# Patient Record
Sex: MALE | URBAN - METROPOLITAN AREA
[De-identification: names, ages, dates, MRNs, and addresses within clinical notes are randomized per-mention and may not be internally consistent; named-entity substitution may affect disease eponyms.]

---

## 2018-01-04 ENCOUNTER — HOSPITAL ENCOUNTER (EMERGENCY)
Facility: CLINIC | Age: 63
Discharge: HOME | End: 2018-01-04
Attending: EMERGENCY MEDICINE | Admitting: EMERGENCY MEDICINE
Payer: COMMERCIAL

## 2018-01-04 ENCOUNTER — APPOINTMENT (OUTPATIENT)
Dept: GENERAL RADIOLOGY | Facility: CLINIC | Age: 63
End: 2018-01-04
Attending: EMERGENCY MEDICINE
Payer: COMMERCIAL

## 2018-01-04 VITALS
SYSTOLIC BLOOD PRESSURE: 104 MMHG | DIASTOLIC BLOOD PRESSURE: 64 MMHG | TEMPERATURE: 100.1 F | RESPIRATION RATE: 18 BRPM | HEART RATE: 115 BPM | OXYGEN SATURATION: 98 %

## 2018-01-04 DIAGNOSIS — J18.9 PNEUMONIA OF RIGHT LOWER LOBE DUE TO INFECTIOUS ORGANISM: ICD-10-CM

## 2018-01-04 DIAGNOSIS — D72.825 BANDEMIA: ICD-10-CM

## 2018-01-04 DIAGNOSIS — R00.0 SINUS TACHYCARDIA: ICD-10-CM

## 2018-01-04 DIAGNOSIS — D64.89 ANEMIA DUE TO OTHER CAUSE, NOT CLASSIFIED: ICD-10-CM

## 2018-01-04 LAB
ABO + RH BLD: NORMAL
ABO + RH BLD: NORMAL
ANION GAP SERPL CALCULATED.3IONS-SCNC: 9 MMOL/L (ref 3–14)
ANISOCYTOSIS BLD QL SMEAR: ABNORMAL
BASOPHILS # BLD AUTO: 0 10E9/L (ref 0–0.2)
BASOPHILS NFR BLD AUTO: 0 %
BLD GP AB SCN SERPL QL: NORMAL
BLOOD BANK CMNT PATIENT-IMP: NORMAL
BUN SERPL-MCNC: 8 MG/DL (ref 7–30)
CALCIUM SERPL-MCNC: 8.1 MG/DL (ref 8.5–10.1)
CHLORIDE SERPL-SCNC: 102 MMOL/L (ref 94–109)
CO2 SERPL-SCNC: 24 MMOL/L (ref 20–32)
COPATH REPORT: NORMAL
CREAT SERPL-MCNC: 0.74 MG/DL (ref 0.66–1.25)
DACRYOCYTES BLD QL SMEAR: SLIGHT
DIFFERENTIAL METHOD BLD: ABNORMAL
EOSINOPHIL # BLD AUTO: 0 10E9/L (ref 0–0.7)
EOSINOPHIL NFR BLD AUTO: 0 %
ERYTHROCYTE [DISTWIDTH] IN BLOOD BY AUTOMATED COUNT: 27.3 % (ref 10–15)
FERRITIN SERPL-MCNC: 2456 NG/ML (ref 26–388)
GFR SERPL CREATININE-BSD FRML MDRD: >90 ML/MIN/1.7M2
GLUCOSE SERPL-MCNC: 126 MG/DL (ref 70–99)
HCT VFR BLD AUTO: 15.8 % (ref 40–53)
HGB BLD-MCNC: 5.4 G/DL (ref 13.3–17.7)
INTERPRETATION ECG - MUSE: NORMAL
IRON SATN MFR SERPL: 14 % (ref 15–46)
IRON SERPL-MCNC: 17 UG/DL (ref 35–180)
LACTATE BLD-SCNC: 1.7 MMOL/L (ref 0.7–2)
LYMPHOCYTES # BLD AUTO: 1.6 10E9/L (ref 0.8–5.3)
LYMPHOCYTES NFR BLD AUTO: 11 %
MCH RBC QN AUTO: 18.4 PG (ref 26.5–33)
MCHC RBC AUTO-ENTMCNC: 34.2 G/DL (ref 31.5–36.5)
MCV RBC AUTO: 54 FL (ref 78–100)
METAMYELOCYTES # BLD: 0.1 10E9/L
METAMYELOCYTES NFR BLD MANUAL: 1 %
MONOCYTES # BLD AUTO: 0.6 10E9/L (ref 0–1.3)
MONOCYTES NFR BLD AUTO: 4 %
NEUTROPHILS # BLD AUTO: 12.4 10E9/L (ref 1.6–8.3)
NEUTROPHILS NFR BLD AUTO: 84 %
NRBC # BLD AUTO: 0.4 10*3/UL
NRBC BLD AUTO-RTO: 3 /100
NUM BPU REQUESTED: 1
OVALOCYTES BLD QL SMEAR: SLIGHT
PLATELET # BLD AUTO: 475 10E9/L (ref 150–450)
PLATELET # BLD EST: ABNORMAL 10*3/UL
POIKILOCYTOSIS BLD QL SMEAR: ABNORMAL
POTASSIUM SERPL-SCNC: 3.3 MMOL/L (ref 3.4–5.3)
RBC # BLD AUTO: 2.93 10E12/L (ref 4.4–5.9)
RBC INCLUSIONS BLD: SLIGHT
RETICS # AUTO: 46.9 10E9/L (ref 25–95)
RETICS/RBC NFR AUTO: 1.6 % (ref 0.5–2)
SMUDGE CELLS BLD QL SMEAR: PRESENT
SODIUM SERPL-SCNC: 135 MMOL/L (ref 133–144)
SPECIMEN EXP DATE BLD: NORMAL
SPHEROCYTES BLD QL SMEAR: SLIGHT
TARGETS BLD QL SMEAR: ABNORMAL
TIBC SERPL-MCNC: 119 UG/DL (ref 240–430)
WBC # BLD AUTO: 14.8 10E9/L (ref 4–11)

## 2018-01-04 PROCEDURE — 83605 ASSAY OF LACTIC ACID: CPT | Performed by: EMERGENCY MEDICINE

## 2018-01-04 PROCEDURE — 93005 ELECTROCARDIOGRAM TRACING: CPT

## 2018-01-04 PROCEDURE — 40000847 ZZHCL STATISTIC MORPHOLOGY W/INTERP HISTOLOGY TC 85060: Performed by: EMERGENCY MEDICINE

## 2018-01-04 PROCEDURE — 25000132 ZZH RX MED GY IP 250 OP 250 PS 637: Performed by: EMERGENCY MEDICINE

## 2018-01-04 PROCEDURE — 86850 RBC ANTIBODY SCREEN: CPT | Performed by: EMERGENCY MEDICINE

## 2018-01-04 PROCEDURE — 96366 THER/PROPH/DIAG IV INF ADDON: CPT

## 2018-01-04 PROCEDURE — 80048 BASIC METABOLIC PNL TOTAL CA: CPT | Performed by: EMERGENCY MEDICINE

## 2018-01-04 PROCEDURE — 71046 X-RAY EXAM CHEST 2 VIEWS: CPT

## 2018-01-04 PROCEDURE — 25000128 H RX IP 250 OP 636: Performed by: EMERGENCY MEDICINE

## 2018-01-04 PROCEDURE — 86901 BLOOD TYPING SEROLOGIC RH(D): CPT | Performed by: EMERGENCY MEDICINE

## 2018-01-04 PROCEDURE — 86923 COMPATIBILITY TEST ELECTRIC: CPT | Performed by: EMERGENCY MEDICINE

## 2018-01-04 PROCEDURE — 99285 EMERGENCY DEPT VISIT HI MDM: CPT | Mod: 25

## 2018-01-04 PROCEDURE — 85060 BLOOD SMEAR INTERPRETATION: CPT | Performed by: EMERGENCY MEDICINE

## 2018-01-04 PROCEDURE — 83550 IRON BINDING TEST: CPT | Performed by: EMERGENCY MEDICINE

## 2018-01-04 PROCEDURE — 25000125 ZZHC RX 250: Performed by: EMERGENCY MEDICINE

## 2018-01-04 PROCEDURE — 83540 ASSAY OF IRON: CPT | Performed by: EMERGENCY MEDICINE

## 2018-01-04 PROCEDURE — 86900 BLOOD TYPING SEROLOGIC ABO: CPT | Performed by: EMERGENCY MEDICINE

## 2018-01-04 PROCEDURE — 85025 COMPLETE CBC W/AUTO DIFF WBC: CPT | Performed by: EMERGENCY MEDICINE

## 2018-01-04 PROCEDURE — 82728 ASSAY OF FERRITIN: CPT | Performed by: EMERGENCY MEDICINE

## 2018-01-04 PROCEDURE — 87040 BLOOD CULTURE FOR BACTERIA: CPT | Performed by: EMERGENCY MEDICINE

## 2018-01-04 PROCEDURE — 96368 THER/DIAG CONCURRENT INF: CPT

## 2018-01-04 PROCEDURE — 96365 THER/PROPH/DIAG IV INF INIT: CPT

## 2018-01-04 PROCEDURE — 94640 AIRWAY INHALATION TREATMENT: CPT

## 2018-01-04 PROCEDURE — 85045 AUTOMATED RETICULOCYTE COUNT: CPT | Performed by: EMERGENCY MEDICINE

## 2018-01-04 RX ORDER — IPRATROPIUM BROMIDE AND ALBUTEROL SULFATE 2.5; .5 MG/3ML; MG/3ML
SOLUTION RESPIRATORY (INHALATION)
Status: DISCONTINUED
Start: 2018-01-04 | End: 2018-01-04 | Stop reason: HOSPADM

## 2018-01-04 RX ORDER — GUAIFENESIN 600 MG/1
TABLET, EXTENDED RELEASE ORAL 2 TIMES DAILY PRN
COMMUNITY

## 2018-01-04 RX ORDER — MULTIVITAMIN,THERAPEUTIC
1 TABLET ORAL DAILY
COMMUNITY

## 2018-01-04 RX ORDER — POTASSIUM CHLORIDE 1500 MG/1
20 TABLET, EXTENDED RELEASE ORAL ONCE
Status: COMPLETED | OUTPATIENT
Start: 2018-01-04 | End: 2018-01-04

## 2018-01-04 RX ORDER — AZITHROMYCIN 250 MG/1
TABLET, FILM COATED ORAL
Qty: 4 TABLET | Refills: 0 | Status: SHIPPED | OUTPATIENT
Start: 2018-01-04

## 2018-01-04 RX ORDER — MENTHOL
LOZENGE MUCOUS MEMBRANE DAILY PRN
COMMUNITY

## 2018-01-04 RX ORDER — CEFTRIAXONE SODIUM 1 G/50ML
1 INJECTION, SOLUTION INTRAVENOUS ONCE
Status: COMPLETED | OUTPATIENT
Start: 2018-01-04 | End: 2018-01-04

## 2018-01-04 RX ORDER — IPRATROPIUM BROMIDE AND ALBUTEROL SULFATE 2.5; .5 MG/3ML; MG/3ML
3 SOLUTION RESPIRATORY (INHALATION) ONCE
Status: COMPLETED | OUTPATIENT
Start: 2018-01-04 | End: 2018-01-04

## 2018-01-04 RX ADMIN — AZITHROMYCIN MONOHYDRATE 500 MG: 500 INJECTION, POWDER, LYOPHILIZED, FOR SOLUTION INTRAVENOUS at 11:07

## 2018-01-04 RX ADMIN — CEFTRIAXONE SODIUM 1 G: 1 INJECTION, SOLUTION INTRAVENOUS at 10:38

## 2018-01-04 RX ADMIN — IPRATROPIUM BROMIDE AND ALBUTEROL SULFATE 3 ML: .5; 3 SOLUTION RESPIRATORY (INHALATION) at 09:30

## 2018-01-04 RX ADMIN — POTASSIUM CHLORIDE 20 MEQ: 1500 TABLET, EXTENDED RELEASE ORAL at 10:38

## 2018-01-04 NOTE — ED NOTES
[facility]  ED Nurse Handoff Report    Saul Trujillo is a 62 year old male   ED Chief complaint: Cough  . ED Diagnosis:   Final diagnoses:   None     Allergies: No Known Allergies    Code Status: Full Code  Activity level - Baseline/Home:  Independent. Activity Level - Current:   Stand with Assist. Lift room needed: No. Bariatric: No   Needed: Yes  Isolation: No. Infection: Not Applicable.     Vital Signs:   Vitals:    01/04/18 1000 01/04/18 1015 01/04/18 1030 01/04/18 1045   BP: 121/63 116/64  115/62   Pulse:       Resp:       Temp:       TempSrc:       SpO2: 98% 97% 95% 95%       Cardiac Rhythm:  ,      Pain level:    Patient confused: No. Patient Falls Risk: No.   Elimination Status: Has voided   Patient Report - Initial Complaint: cough.   Focused Assessment: Respiratory - Respiratory WDL:  WDL except; all; cough Rhythm/Pattern, Respiratory: pattern irregular; shortness of breath reported Lung Fields: All Fields Throughout All Lung Fields: coarse; crackles coarse   Abnormal Results:   XR Chest 2 Views   Preliminary Result   IMPRESSION:  Right lower lobe infiltrate consistent with pneumonia.         Labs Ordered and Resulted from Time of ED Arrival Up to the Time of Departure from the ED   CBC WITH PLATELETS DIFFERENTIAL - Abnormal; Notable for the following:        Result Value    WBC 14.8 (*)     RBC Count 2.93 (*)     Hemoglobin 5.4 (*)     Hematocrit 15.8 (*)     MCV 54 (*)     MCH 18.4 (*)     RDW 27.3 (*)     Platelet Count 475 (*)     Nucleated RBCs 3 (*)     Absolute Neutrophil 12.4 (*)     Absolute Metamyelocytes 0.1 (*)     All other components within normal limits   BASIC METABOLIC PANEL - Abnormal; Notable for the following:     Potassium 3.3 (*)     Glucose 126 (*)     Calcium 8.1 (*)     All other components within normal limits   IRON AND IRON BINDING CAPACITY - Abnormal; Notable for the following:     Iron 17 (*)     All other components within normal limits   LACTIC ACID  WHOLE BLOOD   FERRITIN   BLOOD MORPHOLOGY PATHOLOGIST REVIEW   RETICULOCYTE COUNT   ABO/RH TYPE AND SCREEN   RED BLOOD CELL PREPARE ORDER UNIT   .   Treatments provided: neb, fluids, blood, abx  Family Comments: family at bedside  OBS brochure/video discussed/provided to patient:  No  ED Medications:   Medications   ipratropium - albuterol 0.5 mg/2.5 mg/3 mL (DUONEB) 0.5-2.5 (3) MG/3ML neb solution (not administered)   azithromycin (ZITHROMAX) 500 mg in NaCl 0.9 % 250 mL intermittent infusion (500 mg Intravenous New Bag 1/4/18 1107)   ipratropium - albuterol 0.5 mg/2.5 mg/3 mL (DUONEB) neb solution 3 mL (3 mLs Nebulization Given 1/4/18 0930)   potassium chloride SA (K-DUR/KLOR-CON M) CR tablet 20 mEq (20 mEq Oral Given 1/4/18 1038)   cefTRIAXone in d5w (ROCEPHIN) intermittent infusion 1 g (1 g Intravenous New Bag 1/4/18 1038)     Drips infusing:  Yes- abx  For the majority of the shift, the patient's behavior Green. Interventions performed were therapeutic communication, pt education.     Severe Sepsis OR Septic Shock Diagnosis Present: No      ED Nurse Name/Phone Number: Andrew Ray,   11:24 AM

## 2018-01-04 NOTE — ED NOTES
Pt reports having a cough for the last two weeks. Pt reports having a fever and shortness of breath. Pt denies n/v. Pt took mucinex, vicks, and tylenol with minimal relief. Pt A&Ox4. ABCDs intact.

## 2018-01-04 NOTE — ED AVS SNAPSHOT
[facility] Emergency Department    [address]    [facility] [address]    Phone:  [phone]    Fax:  [phone]                                       Saul Trujillo   MRN: [id number]    Department:  [facility] Emergency Department   Date of Visit:  1/4/2018           After Visit Summary Signature Page     I have received my discharge instructions, and my questions have been answered. I have discussed any challenges I see with this plan with the nurse or doctor.    ..........................................................................................................................................  Patient/Patient Representative Signature      ..........................................................................................................................................  Patient Representative Print Name and Relationship to Patient    ..................................................               ................................................  Date                                            Time    ..........................................................................................................................................  Reviewed by Signature/Title    ...................................................              ..............................................  Date                                                            Time

## 2018-01-04 NOTE — ED AVS SNAPSHOT
[facility] Emergency Department    [address]    [facility] [address]    Phone:  [phone]    Fax:  [phone]                                       Saul Trujillo   MRN: [id number]    Department:  [facility] Emergency Department   Date of Visit:  1/4/2018           Patient Information     Date Of Birth          1955        Your diagnoses for this visit were:     Pneumonia of right lower lobe due to infectious organism (H)     Anemia due to other cause, not classified     Bandemia     Sinus tachycardia        You were seen by Marycruz Palacios MD.      Follow-up Information     Follow up with Isha Nagy. Schedule an appointment as soon as possible for a visit in 1 day.    Contact information:    [address]  [place] [id number] [phone]          Follow up with Simba Altman MD. Schedule an appointment as soon as possible for a visit in 1 day.    Specialty:  Oncology    Contact information:    [place] [facility]  [address] [address]  [facility] [id number] [phone]          Discharge Instructions       Please see a PCP tomorrow for blood testing and a recheck.      Please return to the ED if you have worsening cough, fevers >101, chest pain, shortness of breath, intractable vomiting, or other acute changes.  Please follow up with your PCP TOMORROW.      Drink plenty of fluids and rest.      Your hemoglobin is dangerously low you need this rechecked tomorrow.  Any lightheadedness, dizziness, chest pain return to the ED.      Discharge Instructions  Bronchitis, Pneumonia, Bronchospasm    You were seen today for a chest infection or inflammation. If your provider decided this was due to a bacterial infection, you may need an antibiotic. Sometimes these are caused by a virus, and then an antibiotic will not help.     Generally, every Emergency Department visit should have a follow-up clinic visit with either a primary or a  specialty clinic/provider. Please follow-up as instructed by your emergency provider today.    Return to the Emergency Department if:    Your breathing gets much worse.    You are very weak, or feel much more ill.    You develop new symptoms, such as chest pain.    You cough up blood.    You are vomiting (throwing up) enough that you cannot keep fluids or your medicine down.    What can I do to help myself?    Fill any prescriptions the provider gave you and take them right away--especially antibiotics. Be sure to finish the whole antibiotic prescription.    You may be given a prescription for an inhaler, which can help loosen tight air passages.  Use this as needed, but not more often than directed. Inhalers work much better when used with a spacer.     You may be given a prescription for a steroid to reduce inflammation. Used long-term, these can have side effects, but for short-term use they are safe. You may notice restlessness or increased appetite.        You may use non-prescription cough or cold medicines. Cough medicines may help, but don t make the cough go away completely.     Avoid smoke, because this can make your symptoms worse. If you smoke, this may be a good time to quit! Consider using nicotine lozenges, gum, or patches to reduce cravings.     If you have a fever, Tylenol  (acetaminophen), Motrin  (ibuprofen), or Advil  (ibuprofen) may help bring fever down and may help you feel more comfortable. Be sure to read and follow the package directions, and ask your provider if you have questions.    Be sure to get your flu shot each year.  For certain ages, the pneumonia shot can help prevent pneumonia.  If you were given a prescription for medicine here today, be sure to read all of the information (including the package insert) that comes with your prescription.  This will include important information about the medicine, its side effects, and any warnings that you need to know about.  The pharmacist  who fills the prescription can provide more information and answer questions you may have about the medicine.  If you have questions or concerns that the pharmacist cannot address, please call or return to the Emergency Department.     Remember that you can always come back to the Emergency Department if you are not able to see your regular provider in the amount of time listed above, if you get any new symptoms, or if there is anything that worries you.        Anemia  Anemia is a condition that occurs when your body does not have enough healthy red blood cells (RBCs). RBCs are the parts of your blood that carry oxygen throughout your body. A protein called hemoglobin allows your RBCs to absorb and release oxygen. Without enough RBCs or hemoglobin, your body doesn't get enough oxygen. Symptoms of anemia may then occur.    What are the symptoms of anemia?  Some people with anemia have no symptoms. But most people have symptoms that range from mild to severe. These can include:    Tiredness (fatigue)    Weakness    Pale skin    Shortness of breath    Dizziness or fainting    Rapid heartbeat    Trouble doing normal amounts of activity    Jaundice (yellowing of your eyes, skin, or mouth; dark urine)  What causes anemia?  Anemia can occur when your body:    Loses too much blood    Does not make enough RBCs    Destroys your RBCs at a faster rate than it can replace them    Does not make a normal amount of hemoglobin in your RBCs  These problems can occur for many reasons, including:    A condition that you are born with (congenital or inherited), such as sickle cell disease or thalassemia    Heavy bleeding for any reason, including injury, surgery, childbirth, or even heavy menstrual periods    Being low in certain nutrients, such as iron, folate, or vitamin B12, possibly from a poor diet or a condition like celiac disease or Crohn's disease    Certain chronic conditions like diabetes, arthritis, or kidney  disease    Certain chronic infections like tuberculosis or HIV    Exposure to certain medicines, such as those used for chemotherapy  There are different types of anemia. Your healthcare provider can tell you more about the type of anemia you have and what may have caused it.  How is anemia diagnosed?  To diagnose anemia, your healthcare provider orders blood tests. These can include:    Complete blood cell count (CBC). This test measures the amounts of the different types of blood cells.    Blood smear. This test checks the size and shape of your blood cells. To do the test, a drop of your blood is viewed under a microscope. A stain is used to make the blood cells easier to see.    Iron studies. These tests measure the amount of iron in your blood. Your body needs iron to make hemoglobin in your RBCs.    Vitamin B12 and folate studies. These tests check for some of the components that help give RBCs a normal size and shape.    Reticulocyte count. This test measures the amount of new RBCs that your bone marrow makes.    Hemoglobin electrophoresis. This test checks for problems with your hemoglobin in RBCs.  How is anemia treated?  Treatment for anemia is based on the type of anemia, its cause, and the severity of your symptoms. Treatments may include:    Diet changes. This involves increasing the amount of certain nutrients in your diet, such as iron, vitamin B12, or folate. Your healthcare provider may also prescribe nutrient supplements.    Medicines. Certain medicines treat the cause of your anemia. Others help build new RBCs or relieve symptoms. If a medicine is the cause of your anemia, you may need to stop or change it.    Blood transfusions. Replacing some of your blood can increase the number of healthy RBCs in your body.    Surgery. In some cases, your doctor may do surgery to treat the underlying cause of anemia. If you need surgery, your healthcare provider will explain the procedure and outline the risks  and benefits for you.  What are the long-term concerns?  If you have a certain type of anemia, you can expect a full recovery after treatment. If you have other types of anemia (especially a type you're born with), you will need to manage it for life. Your doctor can tell you more.  Date Last Reviewed: 12/1/2016 2000-2017 [employer]. [address] [place] [address], [place] [address]. All rights reserved. This information is not intended as a substitute for professional medical care. Always follow your healthcare professional's instructions.            24 Hour Appointment Hotline       To make an appointment at any [facility], call [phone] ([phone]). If you don't have a family doctor or clinic, we will help you find one. [facility] clinics are conveniently located to serve the needs of you and your family.             Review of your medicines      START taking        Dose / Directions Last dose taken    amoxicillin-clavulanate 875-125 MG per tablet   Commonly known as:  AUGMENTIN   Dose:  1 tablet   Quantity:  20 tablet        Take 1 tablet by mouth 2 times daily for 10 days   Refills:  0        azithromycin 250 MG tablet   Commonly known as:  ZITHROMAX Z-SAUL   Quantity:  4 tablet        1 tablet daily for 4 days.   Refills:  0          Our records show that you are taking the medicines listed below. If these are incorrect, please call your family doctor or clinic.        Dose / Directions Last dose taken    camphor-eucalyptus-menthol 4.73-1.2-2.6 % Oint ointment        Apply topically daily as needed for cough   Refills:  0        guaiFENesin 600 MG 12 hr tablet   Commonly known as:  MUCINEX        Take by mouth 2 times daily as needed   Refills:  0        multivitamin, therapeutic Tabs tablet   Dose:  1 tablet        Take 1 tablet by mouth daily   Refills:  0                Prescriptions were sent or printed at these locations (2 Prescriptions)                   Other Prescriptions                 Printed at Department/Unit printer (2 of 2)         azithromycin (ZITHROMAX Z-SAUL) 250 MG tablet               amoxicillin-clavulanate (AUGMENTIN) 875-125 MG per tablet                Procedures and tests performed during your visit     ABO/Rh type and screen    Basic metabolic panel    Blood Morphology Pathologist Review    Blood component    Blood culture    CBC with platelets differential    EKG 12-lead, tracing only    Ferritin    Iron and iron binding capacity    Lactic acid whole blood    Red blood cell prepare order unit    Reticulocyte Count    XR Chest 2 Views      Orders Needing Specimen Collection     None      Pending Results     Date and Time Order Name Status Description    1/4/2018 1137 Blood culture In process     1/4/2018 0928 XR Chest 2 Views Preliminary             Pending Culture Results     Date and Time Order Name Status Description    1/4/2018 1137 Blood culture In process             Pending Results Instructions     If you had any lab results that were not finalized at the time of your Discharge, you can call the ED Lab Result RN at [phone]. You will be contacted by this team for any positive Lab results or changes in treatment. The nurses are available 7 days a week from 10A to 6:30P.  You can leave a message 24 hours per day and they will return your call.        Test Results From Your Hospital Stay        1/4/2018 11:32 AM      Component Results     Component Value Ref Range & Units Status    WBC 14.8 (H) 4.0 - 11.0 10e9/L Final    RBC Count 2.93 (L) 4.4 - 5.9 10e12/L Final    Hemoglobin 5.4 (LL) 13.3 - 17.7 g/dL Final    This result has been called to ISAÍAS SOLIS by Christine Quinones on 01 04 2018   at 1014, and has been read back.       Hematocrit 15.8 (L) 40.0 - 53.0 % Final    MCV 54 (L) 78 - 100 fl Final    MCH 18.4 (L) 26.5 - 33.0 pg Final    MCHC 34.2 31.5 - 36.5 g/dL Final    RDW 27.3 (H) 10.0 - 15.0 % Final    Platelet Count 475 (H) 150 - 450 10e9/L Final     Platelets clumped    Diff Method Manual Differential  Final     Performed    % Neutrophils 84.0 % Final    % Lymphocytes 11.0 % Final    % Monocytes 4.0 % Final    % Eosinophils 0.0 % Final    % Basophils 0.0 % Final    % Metamyelocytes 1.0 % Final    Nucleated RBCs 3 (H) 0 /100 Final    Absolute Neutrophil 12.4 (H) 1.6 - 8.3 10e9/L Final    Absolute Lymphocytes 1.6 0.8 - 5.3 10e9/L Final    Absolute Monocytes 0.6 0.0 - 1.3 10e9/L Final    Absolute Eosinophils 0.0 0.0 - 0.7 10e9/L Final    Absolute Basophils 0.0 0.0 - 0.2 10e9/L Final    Absolute Metamyelocytes 0.1 (H) 0 10e9/L Final    Absolute Nucleated RBC 0.4  Final    Anisocytosis Marked  Final    Poikilocytosis Marked  Final    Spherocytes Slight  Final    RBC Fragments Slight  Final    Teardrop Cells Slight  Final    Ovalocytes Slight  Final    Target Cells Marked  Final    Smudge Cells Present  Final    Platelet Estimate   Final    Automated count confirmed.  Giant platelets are present.         1/4/2018 10:12 AM      Component Results     Component Value Ref Range & Units Status    Sodium 135 133 - 144 mmol/L Final    Potassium 3.3 (L) 3.4 - 5.3 mmol/L Final    Chloride 102 94 - 109 mmol/L Final    Carbon Dioxide 24 20 - 32 mmol/L Final    Anion Gap 9 3 - 14 mmol/L Final    Glucose 126 (H) 70 - 99 mg/dL Final    Urea Nitrogen 8 7 - 30 mg/dL Final    Creatinine 0.74 0.66 - 1.25 mg/dL Final    GFR Estimate >90 >60 mL/min/1.7m2 Final    Non  GFR Calc    GFR Estimate If Black >90 >60 mL/min/1.7m2 Final    African American GFR Calc    Calcium 8.1 (L) 8.5 - 10.1 mg/dL Final         1/4/2018  9:54 AM      Component Results     Component Value Ref Range & Units Status    Lactic Acid 1.7 0.7 - 2.0 mmol/L Final         1/4/2018 10:12 AM      Narrative     CHEST TWO VIEWS  1/4/2018 9:54 AM    HISTORY:  Cough, wheezing.     COMPARISON:  None.        Impression     IMPRESSION:  Right lower lobe infiltrate consistent with pneumonia.           1/4/2018  1:23 PM      Component Results     Component Value Ref Range & Units Status    Units Ordered 1  Final    ABO AB  Final    RH(D) Pos  Final    Antibody Screen Neg  Final    Test Valid Only At [facility]     Final    Specimen Expires 01/07/2018  Final         1/4/2018 11:36 AM      Component Results     Component Value Ref Range & Units Status    Iron 17 (L) 35 - 180 ug/dL Final    Iron Binding Cap 119 (L) 240 - 430 ug/dL Final    Iron Saturation Index 14 (L) 15 - 46 % Final         1/4/2018 11:39 AM      Component Results     Component Value Ref Range & Units Status    Ferritin 2456 (H) 26 - 388 ng/mL Final         1/4/2018 12:36 PM      Component Results     Component    Copath Report    Patient Name: DORIS CANDELARIA  MR#: [id number]  Specimen #:   Collected: 1/4/2018  Received: 1/4/2018  Reported: 1/4/2018 12:34  Ordering Phy(s): ALEKSANDER VILLARREAL    For improved result formatting, select 'View Enhanced Report Format' under   Linked Documents section.    TEST(S):  Peripheral Smear Morphology    FINAL DIAGNOSIS:  Peripheral blood.  - Leukocytosis with absolute neutrophilia with slight left shift   compatible with reactive neutrophilia.  Hypochromic microcytic anemia.  Thrombocytosis.    COMMENT:  The anemia combined with the morphologic features suggest the possibility   of an underlying hemoglobinopathy,  thalassemia, or combined hemoglobinopathy thalassemia trait.  Underlying   or superimposed iron deficiency due  to blood loss, nutritional deficiency, malabsorption, hemolysis from   hemoglobinopathy or thalassemia etc.  should also be considered.  An anemia of mixed etiology cannot be   excluded.  The leukocytosis with absolute  neutrophilia is compatible with reactive neutrophilia.  The mild   thrombocytosis is nonspecific and can be  associated with multiple conditions including physiologic, stress,   inflammatory conditions, infection,  asplenic or hyposplenic state, iron  deficiency, etc.  Clinical correlation   is required.    Electronically signed out by:    Jonathan Rogers M.D.    CLINICAL HISTORY:   Pneumonia.  Anemia.    PERIPHERAL BLOOD DATA:    PERIPHERAL BLOOD DATA  Patient Value (Reference Range >18 year old male)  14.8 .......WBC (4.0-11.0 x 10*9/L)  2.93 .......RBC (4.4-5.9 x 10*12/L)  5.4 .......HGB (13.3-17.7 g/dL)  15.8 .......HCT (40.0-53.0 %)  54 .......MCV (78-100fL)  18.4 .......MCH (26.5-33.0 pg)  34.2 .......MCHC (31.5-36.5 g/dL)  27.3 .......RDW (10.0-15.0 %)  475 .......PLT (150-450 x 10*9/L)  1.6 .......RETIC (0.5-2.0%)    PERIPHERAL BLOOD DIFFERENTIAL  (Reference ranges >18 year old)    Percent  83....Neutrophils, segmented and bands (40 - 75)  11....Lymphocytes (20 - 48)  4....Monocytes (0 - 12)  1....Eosinophils (0 - 6)  0....Basophils (0 - 2)  1....  Metamyelocytes    Absolute  12.3....Neutrophils, segmented and bands  (1.6 - 8.3 x 10*9/L)  1.6....Lymphocytes  (0.8 - 5.3 x 10*9/L)  0.6....Monocytes  (0 -1.3 x 10*9/L)  0.1....Eosinophils  (0 - 0.7 x 10*9/L)  0....Basophils  (0 - 0.2 x 10*9/L)    PERIPHERAL MORPHOLOGY:    ERYTHROCYTES: Appear reduced and show prominent anisopoikilocytosis.    There are numerous target forms.  There  are scattered elliptocytes, teardrop forms, and small numbers of   fragments.  There are a small number of  nucleated red blood cells (3 per 100 leukocytes).    LEUKOCYTES: Granulocytes appear predominantly mature and segmented.  There   is a mild left shift.  Some of the  granulocytes show prominent cytoplasmic granules.  Lymphocytes are   predominantly small with occasional  reactive appearing form.  Monocytes appear mature.  No obvious dysplastic   changes are seen.  No blasts are  identified.    PLATELETS: Appear increased.    CPT Codes:  A: 47711-FDEG    TESTING LAB LOCATION:  [facility] [address] [place]  [id number] [phone]    COLLECTION SITE:  Client:  [facility]  Hospital  Location:  [facility] (R)        Linked Documents     View Enhanced Report Format         1/4/2018 11:15 AM      Component Results     Component Value Ref Range & Units Status    % Retic 1.6 0.5 - 2.0 % Final    Absolute Retic 46.9 25 - 95 10e9/L Final         1/4/2018 11:34 AM      Component Results     Component    Unit Number    [id number]    Blood Component Type    Red Blood Cells Leukocyte Reduced    Division Number    00    Status of Unit    Ready for patient 01/04/2018 1134    Blood Product Code    [id number]    Unit Status    ELVIS         1/4/2018 12:34 PM                Clinical Quality Measure: Blood Pressure Screening     Your blood pressure was checked while you were in the emergency department today. The last reading we obtained was  BP: 98/55 . Please read the guidelines below about what these numbers mean and what you should do about them.  If your systolic blood pressure (the top number) is less than 120 and your diastolic blood pressure (the bottom number) is less than 80, then your blood pressure is normal. There is nothing more that you need to do about it.  If your systolic blood pressure (the top number) is 120-139 or your diastolic blood pressure (the bottom number) is 80-89, your blood pressure may be higher than it should be. You should have your blood pressure rechecked within a year by a primary care provider.  If your systolic blood pressure (the top number) is 140 or greater or your diastolic blood pressure (the bottom number) is 90 or greater, you may have high blood pressure. High blood pressure is treatable, but if left untreated over time it can put you at risk for heart attack, stroke, or kidney failure. You should have your blood pressure rechecked by a primary care provider within the next 4 weeks.  If your provider in the emergency department today gave you specific instructions to follow-up with your doctor or provider even sooner than that, you should follow that  "instruction and not wait for up to 4 weeks for your follow-up visit.        Thank you for choosing [facility]       Thank you for choosing [facility] for your care. Our goal is always to provide you with excellent care. Hearing back from our patients is one way we can continue to improve our services. Please take a few minutes to complete the written survey that you may receive in the mail after you visit with us. Thank you!        [employer] Information     [employer] lets you send messages to your doctor, view your test results, renew your prescriptions, schedule appointments and more. To sign up, go to [email or web address] . Click on \"Log in\" on the left side of the screen, which will take you to the Welcome page. Then click on \"Sign up Now\" on the right side of the page.     You will be asked to enter the access code listed below, as well as some personal information. Please follow the directions to create your username and password.     Your access code is: [email or web address]  Expires: 2018  1:03 PM     Your access code will  in 90 days. If you need help or a new code, please call your [facility] clinic or [phone].        Care EveryWhere ID     This is your Care EveryWhere ID. This could be used by other organizations to access your [facility] medical records  [id number]        Equal Access to Services     TASHIA BALDWIN AH: Jaret Botello, waaxda luqadaha, qaybta kaalmada deni, aydin clayton. So [facility] [phone].    ATENCIÓN: Si habla español, tiene a simmons disposición servicios gratuitos de asistencia lingüística. Llame al [phone].    We comply with applicable federal civil rights laws and [place] laws. We do not discriminate on the basis of race, color, national origin, age, disability, sex, sexual orientation, or gender identity.            After Visit Summary       This is your record. Keep this with you and show to your community pharmacist(s) and doctor(s) " at your next visit.

## 2018-01-04 NOTE — Clinical Note
Admitting Physician: YONATAN MASTERS [[id number]]   [id number]   Bed Type: Adult Med/Surg [46]   Special needs: Fall Risk [8]   Bed request comments: 1 PERSON TF

## 2018-01-04 NOTE — PHARMACY-ADMISSION MEDICATION HISTORY
Admission medication history interview status for this patient is complete. See [facility] admission navigator for allergy information, prior to admission medications and immunization status.     Medication history interview source(s):Patient and Family  Medication history resources (including written lists, pill bottles, clinic record):None    Changes made to PTA medication list:  Added: multivitamin  Deleted: tylenol (stopped last week)  Changed: none    Actions taken by pharmacist (provider contacted, etc):None     Additional medication history information:None    Medication reconciliation/reorder completed by provider prior to medication history? No    Do you take OTC medications (eg tylenol, ibuprofen, fish oil, eye/ear drops, etc)? Y(Y/N)    For patients on insulin therapy: N (Y/N)    Time spent in this activity: 5 min    Prior to Admission medications    Medication Sig Last Dose Taking? Auth Provider   camphor-eucalyptus-menthol (VICKS VAPORUB) 4.73-1.2-2.6 % OINT ointment Apply topically daily as needed for cough  Yes Reported, Patient   guaiFENesin (MUCINEX) 600 MG 12 hr tablet Take by mouth 2 times daily as needed  1/3/2018 at Unknown time Yes Reported, Patient   multivitamin, therapeutic (THERA-VIT) TABS tablet Take 1 tablet by mouth daily 1/3/2018 at Unknown time Yes Unknown, Entered By History

## 2018-01-04 NOTE — DISCHARGE INSTRUCTIONS
Please see a PCP tomorrow for blood testing and a recheck.      Please return to the ED if you have worsening cough, fevers >101, chest pain, shortness of breath, intractable vomiting, or other acute changes.  Please follow up with your PCP TOMORROW.      Drink plenty of fluids and rest.      Your hemoglobin is dangerously low you need this rechecked tomorrow.  Any lightheadedness, dizziness, chest pain return to the ED.      Discharge Instructions  Bronchitis, Pneumonia, Bronchospasm    You were seen today for a chest infection or inflammation. If your provider decided this was due to a bacterial infection, you may need an antibiotic. Sometimes these are caused by a virus, and then an antibiotic will not help.     Generally, every Emergency Department visit should have a follow-up clinic visit with either a primary or a specialty clinic/provider. Please follow-up as instructed by your emergency provider today.    Return to the Emergency Department if:    Your breathing gets much worse.    You are very weak, or feel much more ill.    You develop new symptoms, such as chest pain.    You cough up blood.    You are vomiting (throwing up) enough that you cannot keep fluids or your medicine down.    What can I do to help myself?    Fill any prescriptions the provider gave you and take them right away--especially antibiotics. Be sure to finish the whole antibiotic prescription.    You may be given a prescription for an inhaler, which can help loosen tight air passages.  Use this as needed, but not more often than directed. Inhalers work much better when used with a spacer.     You may be given a prescription for a steroid to reduce inflammation. Used long-term, these can have side effects, but for short-term use they are safe. You may notice restlessness or increased appetite.        You may use non-prescription cough or cold medicines. Cough medicines may help, but don t make the cough go away completely.     Avoid  smoke, because this can make your symptoms worse. If you smoke, this may be a good time to quit! Consider using nicotine lozenges, gum, or patches to reduce cravings.     If you have a fever, Tylenol  (acetaminophen), Motrin  (ibuprofen), or Advil  (ibuprofen) may help bring fever down and may help you feel more comfortable. Be sure to read and follow the package directions, and ask your provider if you have questions.    Be sure to get your flu shot each year.  For certain ages, the pneumonia shot can help prevent pneumonia.  If you were given a prescription for medicine here today, be sure to read all of the information (including the package insert) that comes with your prescription.  This will include important information about the medicine, its side effects, and any warnings that you need to know about.  The pharmacist who fills the prescription can provide more information and answer questions you may have about the medicine.  If you have questions or concerns that the pharmacist cannot address, please call or return to the Emergency Department.     Remember that you can always come back to the Emergency Department if you are not able to see your regular provider in the amount of time listed above, if you get any new symptoms, or if there is anything that worries you.        Anemia  Anemia is a condition that occurs when your body does not have enough healthy red blood cells (RBCs). RBCs are the parts of your blood that carry oxygen throughout your body. A protein called hemoglobin allows your RBCs to absorb and release oxygen. Without enough RBCs or hemoglobin, your body doesn't get enough oxygen. Symptoms of anemia may then occur.    What are the symptoms of anemia?  Some people with anemia have no symptoms. But most people have symptoms that range from mild to severe. These can include:    Tiredness (fatigue)    Weakness    Pale skin    Shortness of breath    Dizziness or fainting    Rapid  heartbeat    Trouble doing normal amounts of activity    Jaundice (yellowing of your eyes, skin, or mouth; dark urine)  What causes anemia?  Anemia can occur when your body:    Loses too much blood    Does not make enough RBCs    Destroys your RBCs at a faster rate than it can replace them    Does not make a normal amount of hemoglobin in your RBCs  These problems can occur for many reasons, including:    A condition that you are born with (congenital or inherited), such as sickle cell disease or thalassemia    Heavy bleeding for any reason, including injury, surgery, childbirth, or even heavy menstrual periods    Being low in certain nutrients, such as iron, folate, or vitamin B12, possibly from a poor diet or a condition like celiac disease or Crohn's disease    Certain chronic conditions like diabetes, arthritis, or kidney disease    Certain chronic infections like tuberculosis or HIV    Exposure to certain medicines, such as those used for chemotherapy  There are different types of anemia. Your healthcare provider can tell you more about the type of anemia you have and what may have caused it.  How is anemia diagnosed?  To diagnose anemia, your healthcare provider orders blood tests. These can include:    Complete blood cell count (CBC). This test measures the amounts of the different types of blood cells.    Blood smear. This test checks the size and shape of your blood cells. To do the test, a drop of your blood is viewed under a microscope. A stain is used to make the blood cells easier to see.    Iron studies. These tests measure the amount of iron in your blood. Your body needs iron to make hemoglobin in your RBCs.    Vitamin B12 and folate studies. These tests check for some of the components that help give RBCs a normal size and shape.    Reticulocyte count. This test measures the amount of new RBCs that your bone marrow makes.    Hemoglobin electrophoresis. This test checks for problems with your  hemoglobin in RBCs.  How is anemia treated?  Treatment for anemia is based on the type of anemia, its cause, and the severity of your symptoms. Treatments may include:    Diet changes. This involves increasing the amount of certain nutrients in your diet, such as iron, vitamin B12, or folate. Your healthcare provider may also prescribe nutrient supplements.    Medicines. Certain medicines treat the cause of your anemia. Others help build new RBCs or relieve symptoms. If a medicine is the cause of your anemia, you may need to stop or change it.    Blood transfusions. Replacing some of your blood can increase the number of healthy RBCs in your body.    Surgery. In some cases, your doctor may do surgery to treat the underlying cause of anemia. If you need surgery, your healthcare provider will explain the procedure and outline the risks and benefits for you.  What are the long-term concerns?  If you have a certain type of anemia, you can expect a full recovery after treatment. If you have other types of anemia (especially a type you're born with), you will need to manage it for life. Your doctor can tell you more.  Date Last Reviewed: 12/1/2016 2000-2017 [employer]. [address] [place] [id number]. All rights reserved. This information is not intended as a substitute for professional medical care. Always follow your healthcare professional's instructions.

## 2018-01-04 NOTE — ED PROVIDER NOTES
History     Chief Complaint:  Cough    History is translated by patient's friend. History is limited.    FLOR Trujillo is a 62 year old male who presents to the emergency department today for evaluation of a cough. The patient's friend reports that since 12/24/17 the patient has been experiencing a productive cough, sore throat, and rhinorrhea that has gotten worse every day. Reportedly, the cough gets worse at night, and the patient experiences shortness of breath during bouts of coughing. The patient's friend reports that the patient has tried taking mucinex and tylenol, and has used Rell's Vaporub, but nothing has made his symptoms better. The patient had not gotten his temperature taken at home, but his friend reports that he feels hot. The patient reportedly had diarrhea, but that has since resolved. The patient denies any chest pain, nausea, vomiting, body aches or pains other than his sore throat, and has no history of clots, tuberculosis, or asthma. Of note, the patient's friend reports that the patient recently travelled to [facility] from 12/16/17 to 12/23/17 by plane.     Allergies:  No Known Drug Allergies     Medications:    Tylenol  Vicks Vaporub  Mucinex    Past Medical History:    History reviewed. No pertinent past medical history.    Past Surgical History:    gallstone    Family History:    History reviewed. No pertinent family history.     Social History:  The patient was accompanied to the ED by a friend  Smoking Status: Never Smoker  Smokeless Tobacco: Never Used  Alcohol Use: Negative    Review of Systems   All other systems reviewed and are negative.    Physical Exam     Patient Vitals for the past 24 hrs:   BP Temp Temp src Pulse Heart Rate Resp SpO2   01/04/18 1333 - - - - - 18 -   01/04/18 1331 - - - - - - 98 %   01/04/18 1330 104/64 - - - - - -   01/04/18 1215 98/55 - - - - - 95 %   01/04/18 1200 100/60 - - - - - 95 %   01/04/18 1145 105/58 - - - - - 95 %   01/04/18 1130  107/60 - - - - - 94 %   01/04/18 1115 111/59 - - - - - 95 %   01/04/18 1100 106/61 - - - - - -   01/04/18 1045 115/62 - - - - - 95 %   01/04/18 1030 - - - - - - 95 %   01/04/18 1015 116/64 - - - - - 97 %   01/04/18 1000 121/63 - - - - - 98 %   01/04/18 0932 - - - - - - 94 %   01/04/18 0931 - - - - - - 94 %   01/04/18 0930 116/63 - - - - - 93 %   01/04/18 0923 121/68 100.1  F (37.8  C) Oral 115 115 18 95 %     Physical Exam  General: Resting on the bed.  Head: No obvious trauma to head.  Ears, Nose, Throat:  External ears normal.  Nose normal.    Eyes:  Conjunctivae clear.  Pupils are equal, round, and reactive.   Neck: Normal range of motion.  Neck supple.   CV: Regular rate and rhythm.  No murmurs.      Respiratory: Effort normal and RLL wheezing and coarse breath sounds  Gastrointestinal: Soft.  No distension. There is no tenderness.    Neuro: Alert. Moving all extremities appropriately.  Normal speech.    Skin: Skin is warm and dry.  No rash noted.  Pale appearing     Emergency Department Course     ECG:  ECG taken at 1029, ECG read at 1032  Sinus tachycardia  Nonspecific ST and T wave abnormality   Rate 111 bpm. WV interval 128 ms. QRS duration 68 ms. QT/QTc 346/470 ms. P-R-T axes 77 56 41.    Imaging:  Radiology findings were communicated with the patient who voiced understanding of the findings.    XR Chest 2 Views  Right lower lobe infiltrate consistent with pneumonia.   Reading per radiology    Laboratory:  Laboratory findings were communicated with the patient who voiced understanding of the findings.    Blood culture: Pending  ABO/Rh type and screen: ABO: AB, Rh(D): Positive, Antibody Screen: Negative    Iron, binding capacity: Iron: 17, Iron binding capacity: 119, Saturation index: 14  Ferritin: 2456  CBC: WBC 14.8, HGB 5.4 (LL),   BMP: Potassium: 3.3, Glucose: 126, Calcium: 8.1 o/w WNL (Creatinine 0.74)  Lactic Acid (Collected: 0938): 1.7  Reticulocyte Count: 1.6%    Interventions:  0930 Duoneb 3  mL nebulization  1038 Rocephin 1 gm IV  1038 Potassium chloride 20 mEq PO  1107 Zithromax 500 mg IV    Emergency Department Course:    0923 Nursing notes and vitals reviewed.    0925 I performed an exam of the patient as documented above.     0941 IV was inserted and blood was drawn for laboratory testing, results above.    1006 The patient was rechecked and updated.    1020 The patient was rechecked and updated.    1052 Patient was rechecked and updated.    1140 I spoke with Sandra SPENCER for Dr. David Juarez regarding patient's presentation, findings, and plan of care. They accepted the patient for care.    1241 The patient was rechecked and updated    1251 The patient is discharged and leaving against medical advice.    Impression & Plan      Medical Decision Making:  Saul Trujillo is a 62 year old male with no prior medical history who presents to the emergency department today for evaluation of cough. Initial tachycardia to the 110's but the remainder of vital signs are normal. Low grade temp of 100.1. Differential diagnoses include but are not limited to COPD, asthma, pneumonia, influenza, RSV, etc. Overall, patient does have some coarse breath sounds and wheezing in the right lower lobe. This is consistent with possible pneumonia or COPD. He got one neb without significant improvement, does not appear to be reactive airway disease or anything otherwise associated with this. He is alert and oriented. Satting appropriately. Chest x-ray shows a right lower lobe infiltrate consistent with pneumonia. His lactate is normal, I am not concerned for severe sepsis or septic shock. His white count is elevated at 14.8 which is consistent with an infectious process. Of note his hemoglobin is 5.4. This appears to be a microcytic anemia. Iron studies and ferratin were added on to further investigate.  May represent thalassemia as well.  Given the level of his anemia did offer transfusion and discussed at length with the  patient. He probably needs further evaluation of his anemia as well. He denies signs of bleeding anywhere. He is otherwise typed and screened and awaiting transfusion. Discussed with hospitalist about community acquired pneumonia treatment and anemia workup and treatment. They agree with this plan and will be admitted to the floor.     Addendum:  A patient refusing admission. Feels that he would like to go home. He appears to be competent and understanding his problems. He voiced understanding that he could get very sick and die at home. He was advised to follow up with a hematologist and an internal medicine doctor. He voiced understanding. I tried to make amends with patient to see if there was any way he would possibly stay but he is refusing at this time. Patient appears to be clear and competent, he has understanding of the problem and was still persistently wanting to go home. At this time there is no way to convince patient to stay. He does not meet hold criteria. He appears to be competent and understanding the problem. He is otherwise appearing nontoxic. In reviewing his labs with an internal medicine doctor, there is concern that he potentially has some thalassemia that is undiagnosed. Therefore we will hold off on iron treatment. He was given a hematology referral. At this point he does not appear to be ill enough to affect his mental status. He appears to be alert and oriented. He is understanding the problem and still is persistently wanting to go. Patient was discharged in stable condition.     Diagnosis:    ICD-10-CM    1. Pneumonia of right lower lobe due to infectious organism (H) J18.1 ABO/Rh type and screen     Blood culture   2. Anemia due to other cause, not classified D64.89    3. Bandemia D72.825    4. Sinus tachycardia R00.0      Disposition:   Discharge against medical advice    Discharge Medications:  Discharge Medication List as of 1/4/2018  1:27 PM      START taking these medications     Details   azithromycin (ZITHROMAX Z-SAUL) 250 MG tablet 1 tablet daily for 4 days., Disp-4 tablet, R-0, Local Print      amoxicillin-clavulanate (AUGMENTIN) 875-125 MG per tablet Take 1 tablet by mouth 2 times daily for 10 days, Disp-20 tablet, R-0, Local Print           Scribe Disclosure:  I, Patrick Marcum, am serving as a scribe at 9:20 AM on 1/4/2018 to document services personally performed by Marycruz Palacios MD based on my observations and the provider's statements to me.     [facility] EMERGENCY DEPARTMENT          Marycruz Palacios MD  01/04/18 0442

## 2018-01-08 LAB
BLD PROD TYP BPU: NORMAL
BLD UNIT ID BPU: 0
BLOOD PRODUCT CODE: NORMAL
BPU ID: NORMAL
TRANSFUSION STATUS PATIENT QL: NORMAL
TRANSFUSION STATUS PATIENT QL: NORMAL

## 2018-01-10 LAB
BACTERIA SPEC CULT: NO GROWTH
Lab: NORMAL
SPECIMEN SOURCE: NORMAL